# Patient Record
Sex: FEMALE
[De-identification: names, ages, dates, MRNs, and addresses within clinical notes are randomized per-mention and may not be internally consistent; named-entity substitution may affect disease eponyms.]

---

## 2022-06-29 PROBLEM — Z00.00 ENCOUNTER FOR PREVENTIVE HEALTH EXAMINATION: Status: ACTIVE | Noted: 2022-06-29

## 2022-07-06 ENCOUNTER — APPOINTMENT (OUTPATIENT)
Dept: OTOLARYNGOLOGY | Facility: CLINIC | Age: 47
End: 2022-07-06

## 2022-07-06 PROCEDURE — 31575 DIAGNOSTIC LARYNGOSCOPY: CPT

## 2022-07-06 PROCEDURE — 99202 OFFICE O/P NEW SF 15 MIN: CPT | Mod: 25

## 2022-07-06 NOTE — HISTORY OF PRESENT ILLNESS
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  + e cessive voice use, denies dysphagia

## 2022-07-06 NOTE — PHYSICAL EXAM
[FreeTextEntry1] : + hoarse voice [de-identified] : 4mm left thyroid nodule can not palpate [Midline] : trachea located in midline position [Normal] : no rashes

## 2022-07-06 NOTE — REASON FOR VISIT
[Initial Consultation] : an initial consultation for [FreeTextEntry2] : Referred by Dr Park with hoarseness

## 2022-07-06 NOTE — PROCEDURE
[Hoarseness] : hoarseness not clearly evaluated by indirect laryngoscopy [Topical Lidocaine] : topical lidocaine [Flexible Endoscope] : examined with the flexible endoscope [Lesion(s)] : lesion(s) [Normal] : the epiglottis was regular without inflammation, lesions or masses, with regular aryepiglottic folds, and a smooth petiolus [Glottis Edema] : ~M edematous in the midline [___] : [unfilled]Ucm polyp/cyst in the midline [True Vocal Cords Erythematous] : bilateral true vocal cord edema [Glottis Arytenoid Cartilages] : no arytenoid ulcerations [de-identified] : vocal fold polypoid corditis

## 2022-08-11 ENCOUNTER — APPOINTMENT (OUTPATIENT)
Dept: ENDOCRINOLOGY | Facility: CLINIC | Age: 47
End: 2022-08-11